# Patient Record
Sex: MALE | Race: OTHER | HISPANIC OR LATINO | URBAN - METROPOLITAN AREA
[De-identification: names, ages, dates, MRNs, and addresses within clinical notes are randomized per-mention and may not be internally consistent; named-entity substitution may affect disease eponyms.]

---
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
## 2022-03-02 NOTE — ED PROVIDER NOTE - OBJECTIVE STATEMENT
Admitting Physician:   Admitting Floor: [facility]  
20 y/o male BIB mother with c/o cough x 2 months. notes was seen at [facility] in which xrays WNL, given albuterol inhaler, and amoxicillin for ear infection. notes mild persistent right ear pain and decreased hearing. pt notes productive cough. pt denies cp, sob, fever, chills, hx of asthma, sore throat, or any other complaints.

## 2023-06-05 ENCOUNTER — EMERGENCY (EMERGENCY)
Facility: HOSPITAL | Age: 23
LOS: 1 days | Discharge: HOME | End: 2023-06-05
Attending: EMERGENCY MEDICINE | Admitting: EMERGENCY MEDICINE
Payer: MEDICAID

## 2023-06-05 VITALS
HEART RATE: 76 BPM | OXYGEN SATURATION: 99 % | TEMPERATURE: 98 F | DIASTOLIC BLOOD PRESSURE: 78 MMHG | WEIGHT: 149.91 LBS | SYSTOLIC BLOOD PRESSURE: 123 MMHG | HEIGHT: 68 IN | RESPIRATION RATE: 14 BRPM

## 2023-06-05 LAB
ALBUMIN SERPL ELPH-MCNC: 3.9 G/DL — SIGNIFICANT CHANGE UP (ref 3.3–5)
ALP SERPL-CCNC: 103 U/L — SIGNIFICANT CHANGE UP (ref 30–120)
ALT FLD-CCNC: 32 U/L DA — SIGNIFICANT CHANGE UP (ref 10–60)
ANION GAP SERPL CALC-SCNC: 9 MMOL/L — SIGNIFICANT CHANGE UP (ref 5–17)
AST SERPL-CCNC: 28 U/L — SIGNIFICANT CHANGE UP (ref 10–40)
BASOPHILS # BLD AUTO: 0.07 K/UL — SIGNIFICANT CHANGE UP (ref 0–0.2)
BASOPHILS NFR BLD AUTO: 0.9 % — SIGNIFICANT CHANGE UP (ref 0–2)
BILIRUB SERPL-MCNC: 0.5 MG/DL — SIGNIFICANT CHANGE UP (ref 0.2–1.2)
BUN SERPL-MCNC: 11 MG/DL — SIGNIFICANT CHANGE UP (ref 7–23)
CALCIUM SERPL-MCNC: 9.3 MG/DL — SIGNIFICANT CHANGE UP (ref 8.4–10.5)
CHLORIDE SERPL-SCNC: 103 MMOL/L — SIGNIFICANT CHANGE UP (ref 96–108)
CO2 SERPL-SCNC: 26 MMOL/L — SIGNIFICANT CHANGE UP (ref 22–31)
CREAT SERPL-MCNC: 0.94 MG/DL — SIGNIFICANT CHANGE UP (ref 0.5–1.3)
D DIMER BLD IA.RAPID-MCNC: <150 NG/ML DDU — SIGNIFICANT CHANGE UP
EGFR: 119 ML/MIN/1.73M2 — SIGNIFICANT CHANGE UP
EOSINOPHIL # BLD AUTO: 0.26 K/UL — SIGNIFICANT CHANGE UP (ref 0–0.5)
EOSINOPHIL NFR BLD AUTO: 3.4 % — SIGNIFICANT CHANGE UP (ref 0–6)
GLUCOSE SERPL-MCNC: 104 MG/DL — HIGH (ref 70–99)
HCT VFR BLD CALC: 43.9 % — SIGNIFICANT CHANGE UP (ref 39–50)
HGB BLD-MCNC: 14.7 G/DL — SIGNIFICANT CHANGE UP (ref 13–17)
IMM GRANULOCYTES NFR BLD AUTO: 0.9 % — SIGNIFICANT CHANGE UP (ref 0–0.9)
LYMPHOCYTES # BLD AUTO: 1.37 K/UL — SIGNIFICANT CHANGE UP (ref 1–3.3)
LYMPHOCYTES # BLD AUTO: 17.8 % — SIGNIFICANT CHANGE UP (ref 13–44)
MCHC RBC-ENTMCNC: 31.7 PG — SIGNIFICANT CHANGE UP (ref 27–34)
MCHC RBC-ENTMCNC: 33.5 GM/DL — SIGNIFICANT CHANGE UP (ref 32–36)
MCV RBC AUTO: 94.6 FL — SIGNIFICANT CHANGE UP (ref 80–100)
MONOCYTES # BLD AUTO: 0.58 K/UL — SIGNIFICANT CHANGE UP (ref 0–0.9)
MONOCYTES NFR BLD AUTO: 7.5 % — SIGNIFICANT CHANGE UP (ref 2–14)
NEUTROPHILS # BLD AUTO: 5.35 K/UL — SIGNIFICANT CHANGE UP (ref 1.8–7.4)
NEUTROPHILS NFR BLD AUTO: 69.5 % — SIGNIFICANT CHANGE UP (ref 43–77)
NRBC # BLD: 0 /100 WBCS — SIGNIFICANT CHANGE UP (ref 0–0)
PLATELET # BLD AUTO: 313 K/UL — SIGNIFICANT CHANGE UP (ref 150–400)
POTASSIUM SERPL-MCNC: 4 MMOL/L — SIGNIFICANT CHANGE UP (ref 3.5–5.3)
POTASSIUM SERPL-SCNC: 4 MMOL/L — SIGNIFICANT CHANGE UP (ref 3.5–5.3)
PROT SERPL-MCNC: 7.2 G/DL — SIGNIFICANT CHANGE UP (ref 6–8.3)
RBC # BLD: 4.64 M/UL — SIGNIFICANT CHANGE UP (ref 4.2–5.8)
RBC # FLD: 11.8 % — SIGNIFICANT CHANGE UP (ref 10.3–14.5)
SODIUM SERPL-SCNC: 138 MMOL/L — SIGNIFICANT CHANGE UP (ref 135–145)
TROPONIN I, HIGH SENSITIVITY RESULT: 6.2 NG/L — SIGNIFICANT CHANGE UP
WBC # BLD: 7.7 K/UL — SIGNIFICANT CHANGE UP (ref 3.8–10.5)
WBC # FLD AUTO: 7.7 K/UL — SIGNIFICANT CHANGE UP (ref 3.8–10.5)

## 2023-06-05 PROCEDURE — 84484 ASSAY OF TROPONIN QUANT: CPT

## 2023-06-05 PROCEDURE — 85025 COMPLETE CBC W/AUTO DIFF WBC: CPT

## 2023-06-05 PROCEDURE — 93005 ELECTROCARDIOGRAM TRACING: CPT

## 2023-06-05 PROCEDURE — 99285 EMERGENCY DEPT VISIT HI MDM: CPT | Mod: 25

## 2023-06-05 PROCEDURE — 80053 COMPREHEN METABOLIC PANEL: CPT

## 2023-06-05 PROCEDURE — 93010 ELECTROCARDIOGRAM REPORT: CPT

## 2023-06-05 PROCEDURE — 36415 COLL VENOUS BLD VENIPUNCTURE: CPT

## 2023-06-05 PROCEDURE — 99285 EMERGENCY DEPT VISIT HI MDM: CPT

## 2023-06-05 PROCEDURE — 71046 X-RAY EXAM CHEST 2 VIEWS: CPT

## 2023-06-05 PROCEDURE — 71046 X-RAY EXAM CHEST 2 VIEWS: CPT | Mod: 26

## 2023-06-05 PROCEDURE — 96374 THER/PROPH/DIAG INJ IV PUSH: CPT

## 2023-06-05 PROCEDURE — 85379 FIBRIN DEGRADATION QUANT: CPT

## 2023-06-05 RX ORDER — KETOROLAC TROMETHAMINE 30 MG/ML
30 SYRINGE (ML) INJECTION ONCE
Refills: 0 | Status: DISCONTINUED | OUTPATIENT
Start: 2023-06-05 | End: 2023-06-05

## 2023-06-05 RX ADMIN — Medication 30 MILLIGRAM(S): at 11:43

## 2023-06-05 NOTE — ED PROVIDER NOTE - OBJECTIVE STATEMENT
#[id number]  Patient complaining of 3 days of lower chest pain worse when coughing or taking a deep breath.  Patient reports only occasional coughing.  Patient denies fevers chills short of breath abdominal pain nausea vomiting edema calf pain travel.  No history of PE or DVT.  PMD none

## 2023-06-05 NOTE — ED ADULT NURSE NOTE - CAS TRG GENERAL NORM CIRC DET
Patient to have labs done    Patient will have follow up based on labs    Patient to see dermatologist   Strong peripheral pulses

## 2023-06-05 NOTE — ED PROVIDER NOTE - CLINICAL SUMMARY MEDICAL DECISION MAKING FREE TEXT BOX
#[id number]  Patient complaining of 3 days of lower chest pain worse when coughing or taking a deep breath.  Patient reports only occasional coughing.  Patient denies fevers chills short of breath abdominal pain nausea vomiting edema calf pain travel.  No history of PE or DVT.  PMD none    Plan EKG chest x-ray labs Toradol    Differential including but not limited to MI arrhythmia pneumothorax pneumonia PE

## 2023-06-05 NOTE — ED PROVIDER NOTE - DIFFERENTIAL DIAGNOSIS
Differential Diagnosis Differential including but not limited to MI arrhythmia pneumothorax pneumonia PE

## 2023-06-05 NOTE — ED ADULT NURSE NOTE - NSFALLUNIVINTERV_ED_ALL_ED
Bed/Stretcher in lowest position, wheels locked, appropriate side rails in place/Call bell, personal items and telephone in reach/Instruct patient to call for assistance before getting out of bed/chair/stretcher/Non-slip footwear applied when patient is off stretcher/[place] to call system/Physically safe environment - no spills, clutter or unnecessary equipment/Purposeful proactive rounding/Room/bathroom lighting operational, light cord in reach

## 2023-06-05 NOTE — ED PROVIDER NOTE - PATIENT PORTAL LINK FT
You can access the FollowMyHealth Patient Portal offered by [facility] by registering at the following website: [email or web address]. By joining [employer]’s FollowMyHealth portal, you will also be able to view your health information using other applications (apps) compatible with our system.

## 2023-06-05 NOTE — ED PROVIDER NOTE - PROGRESS NOTE DETAILS
#[id number]  Reevaluated patient at bedside.  Patient feeling well.  Discussed the results of all diagnostic testing in ED and copies of all reports given.   An opportunity to ask questions was given.  Discussed the importance of prompt, close medical follow-up.  Patient will return with any changes, concerns or persistent / worsening symptoms.  Understanding of all instructions verbalized.

## 2023-06-05 NOTE — ED ADULT NURSE NOTE - OBJECTIVE STATEMENT
21 y/o male received aox4 ambulatory c/o intermittent chest pains x2 weeks. denies n/v/d/sob. placed on cardiac monitor, NSR noted, IVL inserted, bloods drawn and sent to lab.

## 2023-06-05 NOTE — ED PROVIDER NOTE - NSFOLLOWUPINSTRUCTIONS_ED_ALL_ED_FT
CHEST PAIN - AfterCare(R) Instructions(ER/ED)    Dolor de pecho    LO QUE NECESITA SABER:    El dolor en el pecho puede ser provocado por timoteo variedad de condiciones, algunas no tan serias y otras que son de peligro mortal. El dolor de pecho también puede ser un síntoma de un problema digestivo, jaclyn la acidez o timoteo úlcera estomacal. Un ataque de ansiedad o timoteo emoción nita, jaclyn el enojo, también pueden provocar dolor de pecho. Timoteo infección, inflamación o fractura en un hueso o cartílago en el pecho podría provocar dolor o molestia. En ocasiones el dolor torácico o la presión en el pecho pueden ser el resultado de charly circulación de la jason al corazón (angina). El dolor de pecho también puede ser causado por trastornos potencialmente mortales jaclyn un ataque al corazón o un coágulo de jason en los pulmones.    INSTRUCCIONES SOBRE EL TARAH HOSPITALARIA:    Llame al número local de emergencias (911 en los [place]) o pídale a alguien que llame si:    Tiene alguno de los siguientes signos de un ataque cardíaco:  Estrujamiento, presión o tensión en pedersen pecho    Usted también podría presentar alguno de los siguientes:  Malestar o dolor en pedersen espalda, sushil, mandíbula, abdomen, o brazo    Falta de aliento    Náuseas o vómitos    Desvanecimiento o sudor frío repentino    Regrese a la saulo de emergencias si:    La inflamación en pedersen pecho empeora, aun con tratamiento.    Usted tose o vomita jason.    Adwoa heces son negras o tienen jason.    Usted no puede dejar de vomitar o le duele al tragar.  Llame a pedersen médico si:    Usted tiene preguntas o inquietudes acerca de pedersen condición o cuidado.    Medicamentos:    Los medicamentospueden administrarse para tratar la causa del dolor de pecho. Por ejemplo, analgésicos, medicamentos para la ansiedad o medicamentos para aumentar el flujo de jason al corazón.    No tome ciertos medicamentos sin antes preguntarle a pedersen médico.Estos incluyen DEA, suplementos vitamínicos y hormonas, jaclyn el estrógeno o la progestina.    [place] adwoa medicamentos jaclyn se le haya indicado.Consulte con pedersen médico si usted sridevi que pedersen medicamento no le está ayudando o si presenta efectos secundarios. Infórmele al médico si usted es alérgico a algún medicamento. Mantenga timoteo lista actualizada de los medicamentos, las vitaminas y los productos herbales que hipolito. Incluya los siguientes datos de los medicamentos: cantidad, frecuencia y motivo de administración. Traiga con usted la lista o los envases de las píldoras a adwoa citas de seguimiento. Lleve la lista de los medicamentos con usted en romain de timoteo emergencia.  Consejos para vivir saludable:Si se conoce la causa de pedersen dolor de pecho, pedersen médico le dará pautas específicas a seguir. Los siguientes son consejos generales de mendy:    No fume.La nicotina y otros químicos contenidos en los cigarrillos y cigarros pueden causar daño a adwoa pulmones y el corazón. Pida información a pedersen médico si usted actualmente fuma y necesita ayuda para dejar de fumar. Los cigarrillos electrónicos o el tabaco sin humo igualmente contienen nicotina. Consulte con pedersen médico antes de utilizar estos productos.    Elija timoteo variedad de alimentos saludables tan a menudo jaclyn sea posible.Incluya frutas y verduras frescas, congeladas o enlatadas. También incluya productos lácteos bajos en grasa, pescado, kamari (sin piel) y salomón magras. Pedersen médico o dietista pueden ayudarlo a crear planes de alimentos. Es posible que tenga que evitar ciertos alimentos o bebidas si el dolor es causado por un problema de digestión.  Alimentos saludables      Reduzca el consumo de sodio (sal).Limite el consumo de alimentos altos en sodio, jaclyn comidas enlatadas, bocadillos salados y embutidos. Si añade dixie cuando cocina la comida, no añada más en la traore. Elija alimentos enlatados bajos en sodio tanto jaclyn sea posible.        Consuma abundante agua al día.El agua ayuda al cuerpo a controlar la temperatura y la presión arterial. Pregunte a pedersen médico cuál es la cantidad de agua que debería consumir cada día.    Pregunte acerca de la actividad.Pedersen médico le dirá cuáles actividades limitar y cuáles evitar. Pregunte cuándo puede manejar, regresar a pedersen trabajo y tener relaciones sexuales. Pida más información acerca de un plan de ejercicio adecuado para usted.    Mantenga un peso saludable.Pregúntele a pedersen médico cuál es el peso ideal para usted. Pídale que lo ayude a crear un plan seguro para bajar de peso si tiene sobrepeso.    Pregunte sobre las vacunas que pudiera necesitar.Pedersen médico puede indicarle qué vacunas necesita y cuándo aplicárselas. Las siguientes vacunas ayudan a prevenir enfermedades que pueden llegar a ser graves para timoteo persona con timoteo afección cardíaca:  La vacuna contra la gripe se aplica todos los años.Vacúnese contra la gripe tan pronto jaclyn se recomiende, normalmente en septiembre u octubre.    La vacuna contra la neumonía generalmente se aplica cada 5 años.Pedersen médico puede recomendarle la vacuna contra la neumonía si tiene 65 años o más.    Las vacunas contra la COVID-19 se administran a los adultos en forma de inyección en 1 o 2 dosis.Se recomienda la vacunación para todos los adultos. También se recomienda timoteo dosis de refuerzo (adicional) para todos los adultos. Se recomienda timoteo segunda dosis refuerzo para todos los adultos de 50 años o más y para los adultos inmunodeprimidos de 18 años o más. La segunda dosis de refuerzo también se recomienda para los adultos que recibieron la vacuna de 1 dosis jaclyn primera dosis y de refuerzo. Pedersen médico puede decirle cuándo recibir timoteo o ambas dosis de refuerzo.  Evite la enfermedad cardíaca  Acuda a timoteo consulta de control con pedersen médico dentro de las 72 horas, o según le hayan indicaron.Es posible que deba regresar para hacerse más pruebas para encontrar la causa del dolor de pecho. Es probable que lo refieran a un especialista, jaclyn un cardiólogo o un gastroenterólogo. Anote adwoa preguntas para que se acuerde de hacerlas imelda adwoa visitas.

## 2023-06-05 NOTE — ED PROVIDER NOTE - NSFOLLOWUPCLINICS_GEN_ALL_ED_FT
[facility]  Family Medicine  [address] [place] [address]  Phone: [phone]  Fax:   Follow Up Time: 1-3 Days

## 2024-01-17 ENCOUNTER — EMERGENCY (EMERGENCY)
Facility: HOSPITAL | Age: 24
LOS: 1 days | Discharge: HOME | End: 2024-01-17
Attending: EMERGENCY MEDICINE | Admitting: EMERGENCY MEDICINE
Payer: MEDICAID

## 2024-01-17 VITALS
TEMPERATURE: 98 F | DIASTOLIC BLOOD PRESSURE: 78 MMHG | OXYGEN SATURATION: 98 % | RESPIRATION RATE: 16 BRPM | SYSTOLIC BLOOD PRESSURE: 117 MMHG | HEART RATE: 78 BPM

## 2024-01-17 VITALS
HEIGHT: 68 IN | HEART RATE: 112 BPM | OXYGEN SATURATION: 98 % | DIASTOLIC BLOOD PRESSURE: 80 MMHG | RESPIRATION RATE: 16 BRPM | SYSTOLIC BLOOD PRESSURE: 122 MMHG | WEIGHT: 149.91 LBS | TEMPERATURE: 100 F

## 2024-01-17 PROCEDURE — 99284 EMERGENCY DEPT VISIT MOD MDM: CPT | Mod: 25

## 2024-01-17 PROCEDURE — 99283 EMERGENCY DEPT VISIT LOW MDM: CPT

## 2024-01-17 PROCEDURE — 10060 I&D ABSCESS SIMPLE/SINGLE: CPT

## 2024-01-17 PROCEDURE — 87205 SMEAR GRAM STAIN: CPT

## 2024-01-17 PROCEDURE — 87186 SC STD MICRODIL/AGAR DIL: CPT

## 2024-01-17 PROCEDURE — 87077 CULTURE AEROBIC IDENTIFY: CPT

## 2024-01-17 PROCEDURE — 10061 I&D ABSCESS COMP/MULTIPLE: CPT

## 2024-01-17 PROCEDURE — 87070 CULTURE OTHR SPECIMN AEROBIC: CPT

## 2024-01-17 RX ORDER — CEPHALEXIN 500 MG
500 CAPSULE ORAL ONCE
Refills: 0 | Status: COMPLETED | OUTPATIENT
Start: 2024-01-17 | End: 2024-01-17

## 2024-01-17 RX ORDER — IBUPROFEN 200 MG
600 TABLET ORAL ONCE
Refills: 0 | Status: COMPLETED | OUTPATIENT
Start: 2024-01-17 | End: 2024-01-17

## 2024-01-17 RX ADMIN — Medication 500 MILLIGRAM(S): at 15:03

## 2024-01-17 RX ADMIN — Medication 1 TABLET(S): at 15:03

## 2024-01-17 RX ADMIN — Medication 600 MILLIGRAM(S): at 13:55

## 2024-01-17 NOTE — ED PROVIDER NOTE - NSFOLLOWUPINSTRUCTIONS_ED_ALL_ED_FT
Follow-up with surgeon for reevaluation, ongoing care and treatment.  Follow-up for wound check and packing change in 2 days.  Keep dressing/wound clean and dry.  Take full course of antibiotics as prescribed.  Take Tylenol Motrin with food as directed for fever/pain.  If having worsening symptoms, streaking, vomiting or other related symptoms, return to the ER immediately.    Incision and Drainage, Care After  After incision and drainage, it is common to have:  Pain or discomfort around the incision site.  Blood, fluid, or pus (drainage) from the incision.  Redness and firm skin around the incision site.  Follow these instructions at home:  Medicines    Take over-the-counter and prescription medicines only as told by your health care provider.  If you were prescribed antibiotics, take them as told by your provider. Do not stop using the antibiotic even if you start to feel better.  Do not apply creams, ointments, or liquids unless you have been told to by your provider.  Wound care    Two stitched wounds. One is normal. The other is red with pus and infected.   Follow instructions from your provider about how to take care of your wound. Make sure you:  Wash your hands with soap and water for at least 20 seconds before and after you change your bandage (dressing). If soap and water are not available, use hand .  Change your dressing and any packing as told by your provider.  If the dressing is dry or stuck when you try to remove it, moisten or wet it with saline or water. This will help you remove it without harming your skin or tissues.  If your wound is packed, leave it in place until your provider tells you to remove it. To remove it, moisten or wet the packing with saline or water.  Leave stitches (sutures), skin glue, or tape strips in place. These skin closures may need to stay in place for 2 weeks or longer. If tape strip edges start to loosen and curl up, you may trim the loose edges. Do not remove tape strips completely unless your provider tells you to do that.  Check your wound every day for signs of infection. Check for:  More redness, swelling, or pain.  More fluid or blood.  Warmth.  Pus or a bad smell.  If you were sent home with a drain tube in place, follow instructions from your provider about:  How to empty it.  How to care for it at home.  Be careful when you get rid of used dressings, wound packing, or drainage.    Activity    Rest the affected area.  Return to your normal activities as told by your provider. Ask your provider what activities are safe for you.  General instructions    Do not use any products that contain nicotine or tobacco. These products include cigarettes, chewing tobacco, and vaping devices, such as e-cigarettes. These can delay incision healing after surgery. If you need help quitting, ask your provider.  Do not take baths, swim, or use a hot tub until your provider approves. Ask your provider if you may take showers. You may only be allowed to take sponge baths.  The incision will keep draining. It is normal to have some clear or slightly bloody drainage. The amount of drainage should go down each day.  Keep all follow-up visits. Your provider will need to make sure that your incision is healing well and that there are no problems.  Your health care provider may give you more instructions. Make sure you know what you can and cannot do    Contact a health care provider if:  Your cyst or abscess comes back.  You have any signs of infection.  You notice red streaks that spread away from the incision site.  You have a fever or chills.  Get help right away if:  You have severe pain or bleeding.  You become short of breath.  You have chest pain.  You have signs of a severe infection. You may notice changes in your incision area, such as:  Swelling that makes the skin feel hard.  Numbness or tingling.  Sudden increase in redness. Your skin color may change from red to purple, and then to dark spots.  Blisters, ulcers, or splitting of the skin.  These symptoms may be an emergency. Get help right away. Call 911.  Do not wait to see if the symptoms will go away.  Do not drive yourself to the hospital.  This information is not intended to replace advice given to you by your health care provider. Make sure you discuss any questions you have with your health care provider.

## 2024-01-17 NOTE — ED PROVIDER NOTE - CARE PROVIDER_API CALL
Abdias Castro  Surgery  [address] [place] [id number]  Phone: [phone]  Fax: [phone]  Follow Up Time: 1-3 Days

## 2024-01-17 NOTE — ED PROVIDER NOTE - PATIENT PORTAL LINK FT
You can access the FollowMyHealth Patient Portal offered by [facility] by registering at the following website: [email or web address]. By joining [employer]’s FollowMyHealth portal, you will also be able to view your health information using other applications (apps) compatible with our system.

## 2024-01-17 NOTE — ED PROVIDER NOTE - OBJECTIVE STATEMENT
23-year-old male with no past medical history presents with complaint of abscess to left buttock x 4 days.  Patient states that he noticed swelling with pain to his left buttock 4 days ago which has been getting worse.  Admits to chills.  Denies fever, nausea, vomiting, trauma or other symptoms.

## 2024-01-17 NOTE — ED PROVIDER NOTE - PROGRESS NOTE DETAILS
I&D of abscess performed at bedside, pt tolerated well, NAD, feeling much better, packing placed, wound cx sent, will rx bactrim and duricef, nsaids prn pain, f/u with surgery, wound check and packing change in 2 days.

## 2024-01-17 NOTE — ED PROVIDER NOTE - CLINICAL SUMMARY MEDICAL DECISION MAKING FREE TEXT BOX
23-year-old male with no past medical history presents with complaint of abscess to left buttock x 4 days.  Patient states that he noticed swelling with pain to his left buttock 4 days ago which has been getting worse.  Admits to chills.  Denies fever, nausea, vomiting, trauma or other symptoms.    PE 3cm fluctuant abscess left buttock inner aspect. No pointing drainage or cellulitis.  Plan - I and D, antibiotics, surgery FU.

## 2024-01-17 NOTE — ED PROVIDER NOTE - SKIN, MLM
+abscess noted to left medial buttock with central fluctuance, no drainage or streaking noted, no involvement of rectum noted

## 2024-01-17 NOTE — ED ADULT NURSE NOTE - CADM POA CENTRAL LINE
[address] [facility] coding opportunities       Chart reviewed, no opportunity found: CHART REVIEWED, NO OPPORTUNITY FOUND        Patients Insurance     Medicare Insurance: Medicare No

## 2024-01-18 LAB
GRAM STN FLD: ABNORMAL
SPECIMEN SOURCE: SIGNIFICANT CHANGE UP

## 2024-01-19 ENCOUNTER — EMERGENCY (EMERGENCY)
Facility: HOSPITAL | Age: 24
LOS: 1 days | Discharge: HOME | End: 2024-01-19
Attending: EMERGENCY MEDICINE | Admitting: EMERGENCY MEDICINE
Payer: MEDICAID

## 2024-01-19 VITALS
SYSTOLIC BLOOD PRESSURE: 128 MMHG | HEART RATE: 104 BPM | HEIGHT: 68 IN | TEMPERATURE: 98 F | RESPIRATION RATE: 14 BRPM | DIASTOLIC BLOOD PRESSURE: 72 MMHG | WEIGHT: 149.91 LBS | OXYGEN SATURATION: 97 %

## 2024-01-19 VITALS
HEART RATE: 100 BPM | DIASTOLIC BLOOD PRESSURE: 78 MMHG | TEMPERATURE: 98 F | OXYGEN SATURATION: 98 % | SYSTOLIC BLOOD PRESSURE: 136 MMHG | RESPIRATION RATE: 15 BRPM

## 2024-01-19 LAB
-  AMPICILLIN/SULBACTAM: SIGNIFICANT CHANGE UP
-  CEFAZOLIN: SIGNIFICANT CHANGE UP
-  CLINDAMYCIN: SIGNIFICANT CHANGE UP
-  ERYTHROMYCIN: SIGNIFICANT CHANGE UP
-  GENTAMICIN: SIGNIFICANT CHANGE UP
-  OXACILLIN: SIGNIFICANT CHANGE UP
-  PENICILLIN: SIGNIFICANT CHANGE UP
-  RIFAMPIN: SIGNIFICANT CHANGE UP
-  TETRACYCLINE: SIGNIFICANT CHANGE UP
-  TRIMETHOPRIM/SULFAMETHOXAZOLE: SIGNIFICANT CHANGE UP
-  VANCOMYCIN: SIGNIFICANT CHANGE UP
METHOD TYPE: SIGNIFICANT CHANGE UP

## 2024-01-19 PROCEDURE — L9995: CPT

## 2024-01-19 PROCEDURE — G0463: CPT

## 2024-01-19 NOTE — ED PROVIDER NOTE - PATIENT PORTAL LINK FT
You can access the FollowMyHealth Patient Portal offered by [facility] by registering at the following website: [email or web address]. By joining [employer]’s FollowMyHealth portal, you will also be able to view your health information using other applications (apps) compatible with our system.

## 2024-01-19 NOTE — ED PROVIDER NOTE - CARE PROVIDER_API CALL
Abdias Castro  Surgery  [address] [place] [address]  Phone: [phone]  Fax: [phone]  Follow Up Time: 1-3 Days    Vel Carrillo  Surgery  [address] [place] [address]  Phone: [phone]  Fax: [phone]  Follow Up Time: 1-3 Days

## 2024-01-19 NOTE — ED ADULT NURSE NOTE - OBJECTIVE STATEMENT
pt for wound check s/p I and d abscess to buttocks. pt states feels a lot better. packing in place mild erythema to area. pus to bandage

## 2024-01-19 NOTE — ED PROVIDER NOTE - PROVIDER TOKENS
PROVIDER:[TOKEN:[[id number]],FOLLOWUP:[1-3 Days]],PROVIDER:[TOKEN:[[id number]],FOLLOWUP:[1-3 Days]]

## 2024-01-19 NOTE — ED PROVIDER NOTE - NSFOLLOWUPINSTRUCTIONS_ED_ALL_ED_FT
Follow-up with surgeon as discussed for reevaluation, ongoing care and treatment.  Keep wound clean and dry.  Finish course of both antibiotics as prescribed earlier.  If having worsening of symptoms, streaking, fever, vomiting or other related symptoms, return to the ER immediately.    Incision and Drainage, Care After  After incision and drainage, it is common to have:  Pain or discomfort around the incision site.  Blood, fluid, or pus (drainage) from the incision.  Redness and firm skin around the incision site.  Follow these instructions at home:  Medicines    Take over-the-counter and prescription medicines only as told by your health care provider.  If you were prescribed antibiotics, take them as told by your provider. Do not stop using the antibiotic even if you start to feel better.  Do not apply creams, ointments, or liquids unless you have been told to by your provider.  Wound care    Two stitched wounds. One is normal. The other is red with pus and infected.   Follow instructions from your provider about how to take care of your wound. Make sure you:  Wash your hands with soap and water for at least 20 seconds before and after you change your bandage (dressing). If soap and water are not available, use hand .  Change your dressing and any packing as told by your provider.  If the dressing is dry or stuck when you try to remove it, moisten or wet it with saline or water. This will help you remove it without harming your skin or tissues.  If your wound is packed, leave it in place until your provider tells you to remove it. To remove it, moisten or wet the packing with saline or water.  Leave stitches (sutures), skin glue, or tape strips in place. These skin closures may need to stay in place for 2 weeks or longer. If tape strip edges start to loosen and curl up, you may trim the loose edges. Do not remove tape strips completely unless your provider tells you to do that.  Check your wound every day for signs of infection. Check for:  More redness, swelling, or pain.  More fluid or blood.  Warmth.  Pus or a bad smell.  If you were sent home with a drain tube in place, follow instructions from your provider about:  How to empty it.  How to care for it at home.  Be careful when you get rid of used dressings, wound packing, or drainage.    Activity    Rest the affected area.  Return to your normal activities as told by your provider. Ask your provider what activities are safe for you.  General instructions    Do not use any products that contain nicotine or tobacco. These products include cigarettes, chewing tobacco, and vaping devices, such as e-cigarettes. These can delay incision healing after surgery. If you need help quitting, ask your provider.  Do not take baths, swim, or use a hot tub until your provider approves. Ask your provider if you may take showers. You may only be allowed to take sponge baths.  The incision will keep draining. It is normal to have some clear or slightly bloody drainage. The amount of drainage should go down each day.  Keep all follow-up visits. Your provider will need to make sure that your incision is healing well and that there are no problems.  Your health care provider may give you more instructions. Make sure you know what you can and cannot do    Contact a health care provider if:  Your cyst or abscess comes back.  You have any signs of infection.  You notice red streaks that spread away from the incision site.  You have a fever or chills.  Get help right away if:  You have severe pain or bleeding.  You become short of breath.  You have chest pain.  You have signs of a severe infection. You may notice changes in your incision area, such as:  Swelling that makes the skin feel hard.  Numbness or tingling.  Sudden increase in redness. Your skin color may change from red to purple, and then to dark spots.  Blisters, ulcers, or splitting of the skin.  These symptoms may be an emergency. Get help right away. Call 911.  Do not wait to see if the symptoms will go away.  Do not drive yourself to the hospital.  This information is not intended to replace advice given to you by your health care provider. Make sure you discuss any questions you have with your health care provider.

## 2024-01-19 NOTE — ED PROVIDER NOTE - CARE PROVIDERS DIRECT ADDRESSES
,[email or web address]

## 2024-01-19 NOTE — ED ADULT NURSE NOTE - NSFALLUNIVINTERV_ED_ALL_ED
Bed/Stretcher in lowest position, wheels locked, appropriate side rails in place/Call bell, personal items and telephone in reach/Instruct patient to call for assistance before getting out of bed/chair/stretcher/Non-slip footwear applied when patient is off stretcher/[place] to call system/Physically safe environment - no spills, clutter or unnecessary equipment/Purposeful proactive rounding/Room/bathroom lighting operational, light cord in reach

## 2024-01-19 NOTE — ED ADULT NURSE NOTE - CHIEF COMPLAINT
continue with PT and OT  acute rehab consulted, recommending acute rehab The patient is a 23y Male complaining of abscess.

## 2024-01-19 NOTE — ED PROVIDER NOTE - SKIN, MLM
packing noted in place to drained left medial buttock abscess, no active drainage noted, no fluctuance or induration noted, no streaking noted

## 2024-01-19 NOTE — ED PROVIDER NOTE - OBJECTIVE STATEMENT
23-year-old male with no significant past medical history presents for wound check and packing change status post I&D of left buttock abscess on 1/17.  Patient states that his pain is much better now and is feeling better.  Denies fever, chills, nausea, vomiting, streaking or other symptoms. Pt is currently taking duricef and bactrim.

## 2024-01-19 NOTE — ED ADULT NURSE REASSESSMENT NOTE - NS ED NURSE REASSESS COMMENT FT1
pt re evaluated by md and to be d'c/d  pt discharged stable and ambulatory in nad at present d/c instruction reinforced and pt verbalized understanding vital signs as charted  pt advised to continue antibiotic and change dressing when dirty annd return to Ed for worsening s/s

## 2024-01-19 NOTE — ED PROVIDER NOTE - CLINICAL SUMMARY MEDICAL DECISION MAKING FREE TEXT BOX
23-year-old male with left buttock abscess, status post I&D 2 days ago in the ED.  Patient was started antibiotics.  Patient states some discomfort, although improving.  No fever or chills.  No difficulty with stool.  No blood in the stool.  No abdominal pain.  No aggravating or alleviating factors otherwise noted.  No acute injury or complaints.  Exam: Nontoxic, well-appearing.  Positive abscess to left medial buttock status post I&D.  Minimal erythema without signs of significant flexion.  No discharge from the wound.  Nontender.  No crepitus.  No other acute findings.  No extension towards rectum.  Wound check status post I&D, healing well without signs of significant infection.  Will continue antibiotics, outpatient follow-up.

## 2024-01-22 LAB
CULTURE RESULTS: ABNORMAL
ORGANISM # SPEC MICROSCOPIC CNT: ABNORMAL
ORGANISM # SPEC MICROSCOPIC CNT: ABNORMAL
SPECIMEN SOURCE: SIGNIFICANT CHANGE UP